# Patient Record
Sex: FEMALE | Race: AMERICAN INDIAN OR ALASKA NATIVE
[De-identification: names, ages, dates, MRNs, and addresses within clinical notes are randomized per-mention and may not be internally consistent; named-entity substitution may affect disease eponyms.]

---

## 2018-10-30 ENCOUNTER — HOSPITAL ENCOUNTER (OUTPATIENT)
Dept: HOSPITAL 31 - C.SDS | Age: 53
Discharge: HOME | End: 2018-10-30
Attending: OBSTETRICS & GYNECOLOGY
Payer: COMMERCIAL

## 2018-10-30 VITALS
RESPIRATION RATE: 20 BRPM | SYSTOLIC BLOOD PRESSURE: 129 MMHG | OXYGEN SATURATION: 99 % | DIASTOLIC BLOOD PRESSURE: 72 MMHG | TEMPERATURE: 97.8 F | HEART RATE: 77 BPM

## 2018-10-30 VITALS — BODY MASS INDEX: 30.7 KG/M2

## 2018-10-30 DIAGNOSIS — D25.9: Primary | ICD-10-CM

## 2018-10-30 PROCEDURE — 58561 HYSTEROSCOPY REMOVE MYOMA: CPT

## 2018-10-30 PROCEDURE — 86900 BLOOD TYPING SEROLOGIC ABO: CPT

## 2018-10-30 PROCEDURE — 88305 TISSUE EXAM BY PATHOLOGIST: CPT

## 2018-10-30 PROCEDURE — 37243 VASC EMBOLIZE/OCCLUDE ORGAN: CPT

## 2018-10-30 PROCEDURE — 36415 COLL VENOUS BLD VENIPUNCTURE: CPT

## 2018-10-30 PROCEDURE — 86850 RBC ANTIBODY SCREEN: CPT

## 2018-10-30 RX ADMIN — HYDROMORPHONE HYDROCHLORIDE PRN MG: 1 INJECTION, SOLUTION INTRAMUSCULAR; INTRAVENOUS; SUBCUTANEOUS at 09:15

## 2018-10-30 RX ADMIN — HYDROMORPHONE HYDROCHLORIDE PRN MG: 1 INJECTION, SOLUTION INTRAMUSCULAR; INTRAVENOUS; SUBCUTANEOUS at 09:05

## 2018-10-30 NOTE — OP
PROCEDURE DATE:  10/30/2018



PREOPERATIVE DIAGNOSES:  Fibroid uterus, menorrhagia.



POSTOPERATIVE DIAGNOSES:  Fibroid uterus, menorrhagia.



PROCEDURES:  Hysteroscopy, hysteroscopic myomectomy.



FINDINGS:  A bulky fibroid uterus and 2 cm anterior submucosal myoma.



SURGEON:  Daryn Panda MD



ANESTHESIA:  General.



ESTIMATED BLOOD LOSS:  Less than 1 mL.



COMPLICATIONS:  Nil.



DESCRIPTION OF PROCEDURE:  After the risks, benefits and alternatives of

the planned procedures including but not limited to infection, hemorrhage,

deep vein thrombosis, atelectasis, pneumonia, pulmonary embolism, damage to

the bladder, damage to the ureter, renal insufficiency, renal failure,

wound infection, wound dehiscence, incisional hernia, keloid formation,

damage to the large and small intestines, damage to the inferior vena cava

and aorta requiring extensive repair, anesthesia complications, electrolyte

imbalance, possibility of death, fluid overload, cerebral edema, air

embolism and other complications that were discussed but are not listed

above had been explained to the patient and all her questions answered,

informed consent was obtained.



The patient was taken to the operating room in a stable condition.  Under a

suitable level of general anesthesia, she was prepped and draped in a

sterile fashion after having been placed in the dorsal lithotomy position. 

A weighted speculum was inserted into the vagina.  The anterior lip of the

cervix was grasped using a single tooth tenaculum.  An endocervical

curettage was performed and scant tissue was obtained.  The cervix was then

dilated to a #18 Hanks dilator.  A hysteroscope was inserted into the

uterus.  Using a MyoSure device, a 2 cm anterior submucosal myoma was

resected up to the level of the endometrium with good hemostasis.  The

hysteroscope was then removed and endometrial curettage was performed. 

Scant tissue was obtained.  With excellent hemostasis, the instruments were

removed from the vagina.  FloSeal was also applied inside the endometrial

cavity.  The patient was then transferred to the recovery room in stable

condition.  Pad and instruments counts were correct x2.  There were no

complications.





__________________________________________

Daryn Panda MD





DD:  10/30/2018 8:40:39

DT:  10/30/2018 10:52:23

Job # [id number]